# Patient Record
Sex: FEMALE | Race: WHITE
[De-identification: names, ages, dates, MRNs, and addresses within clinical notes are randomized per-mention and may not be internally consistent; named-entity substitution may affect disease eponyms.]

---

## 2017-05-28 ENCOUNTER — HOSPITAL ENCOUNTER (EMERGENCY)
Dept: HOSPITAL 10 - E/R | Age: 27
Discharge: HOME | End: 2017-05-28
Payer: SELF-PAY

## 2017-05-28 VITALS — WEIGHT: 154.32 LBS | HEIGHT: 55 IN | BODY MASS INDEX: 35.71 KG/M2

## 2017-05-28 VITALS
DIASTOLIC BLOOD PRESSURE: 92 MMHG | HEART RATE: 70 BPM | SYSTOLIC BLOOD PRESSURE: 136 MMHG | RESPIRATION RATE: 16 BRPM | TEMPERATURE: 98 F

## 2017-05-28 DIAGNOSIS — R11.2: ICD-10-CM

## 2017-05-28 DIAGNOSIS — K80.50: Primary | ICD-10-CM

## 2017-05-28 DIAGNOSIS — N39.0: ICD-10-CM

## 2017-05-28 DIAGNOSIS — Z87.891: ICD-10-CM

## 2017-05-28 LAB
ADD SCAN DIFF: NO
ADD UMIC: YES
ALBUMIN SERPL-MCNC: 4.7 G/DL (ref 3.3–4.9)
ALBUMIN/GLOB SERPL: 1.46 {RATIO}
ALP SERPL-CCNC: 56 IU/L (ref 42–121)
ALT SERPL-CCNC: 24 IU/L (ref 13–69)
ANION GAP SERPL CALC-SCNC: 11 MMOL/L (ref 8–16)
AST SERPL-CCNC: 21 IU/L (ref 15–46)
BACTERIA #/AREA URNS HPF: (no result) /[HPF]
BASOPHILS # BLD AUTO: 0 10^3/UL (ref 0–0.1)
BASOPHILS NFR BLD: 0.1 % (ref 0–2)
BILIRUB DIRECT SERPL-MCNC: 0 MG/DL (ref 0–0.2)
BILIRUB SERPL-MCNC: 0.5 MG/DL (ref 0.2–1.3)
BUN SERPL-MCNC: 14 MG/DL (ref 7–20)
CALCIUM SERPL-MCNC: 9.1 MG/DL (ref 8.4–10.2)
CHLORIDE SERPL-SCNC: 106 MMOL/L (ref 97–110)
CO2 SERPL-SCNC: 30 MMOL/L (ref 21–31)
COLOR UR: YELLOW
CREAT SERPL-MCNC: 0.61 MG/DL (ref 0.44–1)
EOSINOPHIL # BLD: 0.1 10^3/UL (ref 0–0.5)
EOSINOPHIL NFR BLD: 0.9 % (ref 0–7)
ERYTHROCYTE [DISTWIDTH] IN BLOOD BY AUTOMATED COUNT: 12.7 % (ref 11.5–14.5)
GLOBULIN SER-MCNC: 3.2 G/DL (ref 1.3–3.2)
GLUCOSE SERPL-MCNC: 91 MG/DL (ref 70–220)
GLUCOSE UR STRIP-MCNC: NEGATIVE %
HCT VFR BLD CALC: 46 % (ref 37–47)
HGB BLD-MCNC: 15.8 G/DL (ref 12–16)
KETONES UR STRIP.AUTO-MCNC: NEGATIVE MG/DL
LYMPHOCYTES # BLD AUTO: 0.9 10^3/UL (ref 0.8–2.9)
LYMPHOCYTES NFR BLD AUTO: 6.4 % (ref 15–51)
MCH RBC QN AUTO: 30.4 PG (ref 29–33)
MCHC RBC AUTO-ENTMCNC: 34.3 G/DL (ref 32–37)
MCV RBC AUTO: 88.6 FL (ref 82–101)
MONOCYTES # BLD: 0.6 10^3/UL (ref 0.3–0.9)
MONOCYTES NFR BLD: 4.5 % (ref 0–11)
NEUTROPHILS # BLD: 12 10^3/UL (ref 1.6–7.5)
NEUTROPHILS NFR BLD AUTO: 87.7 % (ref 39–77)
NITRITE UR QL STRIP.AUTO: NEGATIVE
NRBC # BLD MANUAL: 0 10^3/UL (ref 0–0)
NRBC BLD QL: 0 /100WBC (ref 0–0)
PLATELET # BLD: 285 10^3/UL (ref 140–415)
PMV BLD AUTO: 9.3 FL (ref 7.4–10.4)
POTASSIUM SERPL-SCNC: 3.7 MMOL/L (ref 3.5–5.1)
PROT SERPL-MCNC: 7.9 G/DL (ref 6.1–8.1)
RBC # BLD AUTO: 5.19 10^6/UL (ref 4.2–5.4)
RBC # UR AUTO: NEGATIVE /UL
RBC #/AREA URNS HPF: (no result) /HPF
SODIUM SERPL-SCNC: 143 MMOL/L (ref 135–144)
SQUAMOUS #/AREA URNS HPF: (no result) /[HPF]
URINE BILIRUBIN (DIP): NEGATIVE
URINE TOTAL PROTEIN (DIP): (no result)
UROBILINOGEN UR STRIP-ACNC: (no result) (ref 0.1–1)
WBC # BLD AUTO: 13.6 10^3/UL (ref 4.8–10.8)
WBC # UR STRIP: (no result) /UL

## 2017-05-28 PROCEDURE — 99285 EMERGENCY DEPT VISIT HI MDM: CPT

## 2017-05-28 PROCEDURE — 96374 THER/PROPH/DIAG INJ IV PUSH: CPT

## 2017-05-28 PROCEDURE — 76705 ECHO EXAM OF ABDOMEN: CPT

## 2017-05-28 PROCEDURE — 81001 URINALYSIS AUTO W/SCOPE: CPT

## 2017-05-28 PROCEDURE — 36415 COLL VENOUS BLD VENIPUNCTURE: CPT

## 2017-05-28 PROCEDURE — 83690 ASSAY OF LIPASE: CPT

## 2017-05-28 PROCEDURE — 85025 COMPLETE CBC W/AUTO DIFF WBC: CPT

## 2017-05-28 PROCEDURE — 96375 TX/PRO/DX INJ NEW DRUG ADDON: CPT

## 2017-05-28 PROCEDURE — 80053 COMPREHEN METABOLIC PANEL: CPT

## 2017-05-28 NOTE — ERD
ER Documentation


Chief Complaint


Date/Time


DATE: 5/28/17 


TIME: 20:25


Chief Complaint








HPI


26-year-old female with increasing sharp upper abdominal pain with nausea and 

vomiting.  She has a history of gallstones in fact scheduled for surgery next 

week to have her gallbladder removed.  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 

started to have unremitting pain.  She has had no fevers or chills.  She did 

just quit smoking.





ROS


All systems reviewed and are negative except as per history of present illness.





Medications


Home Meds


Active Scripts


Ondansetron (Ondansetron Odt) 4 Mg Tab.rapdis, 4 MG PO Q6H Y for NAUSEA AND/OR 

VOMITING, #20 TAB


   Prov:ANITHA NERI DO         5/28/17


Naproxen* (Naproxen*) 500 Mg Tablet, 500 MG PO BID Y for PAIN, #20 TAB


   Prov:ANITHA NERI DO         5/28/17


Hydrocodone/Acetaminophen (Norco 5-325 Tablet) 1 Each Tablet, 1 EACH PO Q6, #20 

TAB


   Prov:ANITHA NERI DO         5/28/17





PMhx/Soc


Medical and Surgical Hx:  pt denies Medical Hx, pt denies Surgical Hx


Hx Alcohol Use:  No


Hx Substance Use:  No


Hx Tobacco Use:  Yes


Smoking Status:  Former smoker





Physical Exam


Vitals





Vital Signs








  Date Time  Temp Pulse Resp B/P Pulse Ox O2 Delivery O2 Flow Rate FiO2


 


5/28/17 17:28 98.4 108 18 147/89 99   








Physical Exam


Const:  []


Head:   Atraumatic 


Eyes:    Normal Conjunctiva


ENT:    Normal External Ears, Nose and Mouth.


Neck:               Full range of motion..~ No meningismus.


Resp:    Clear to auscultation bilaterally


Cardio:    Regular rate and rhythm, no murmurs


Abd:    Soft, non tender, non distended. Normal bowel sounds


Skin:    No petechiae or rashes


Back:    No midline or flank tenderness


Ext:    No cyanosis, or edema


Neur:    Awake and alert


Psych:    Normal Mood and Affect


Result Diagram:  


5/28/17 1750                                                                   

             5/28/17 1750





Results 24 hrs





 Laboratory Tests








Test


  5/28/17


12:10 5/28/17


17:50


 


Urine Color YELLOW  


 


Urine Clarity CLEAR  


 


Urine pH 8.0  


 


Urine Specific Gravity 1.010  


 


Urine Ketones NEGATIVE  


 


Urine Nitrite NEGATIVE  


 


Urine Bilirubin NEGATIVE  


 


Urine Urobilinogen 1.0 E.U./dL  


 


Urine Leukocyte Esterase TRACE  


 


Urine Microscopic RBC Pending  


 


Urine Microscopic WBC Pending  


 


Urine Hemoglobin NEGATIVE  


 


Urine Glucose NEGATIVE%  


 


Urine Total Protein 1+  


 


White Blood Count  13.610^3/ul 


 


Red Blood Count  5.1910^6/ul 


 


Hemoglobin  15.8g/dl 


 


Hematocrit  46.0% 


 


Mean Corpuscular Volume  88.6fl 


 


Mean Corpuscular Hemoglobin  30.4pg 


 


Mean Corpuscular Hemoglobin


Concent 


  34.3g/dl 


 


 


Red Cell Distribution Width  12.7% 


 


Platelet Count  70218^3/UL 


 


Mean Platelet Volume  9.3fl 


 


Neutrophils %  87.7% 


 


Lymphocytes %  6.4% 


 


Monocytes %  4.5% 


 


Eosinophils %  0.9% 


 


Basophils %  0.1% 


 


Nucleated Red Blood Cells %  0.0/100WBC 


 


Neutrophils #  12.010^3/ul 


 


Lymphocytes #  0.910^3/ul 


 


Monocytes #  0.610^3/ul 


 


Eosinophils #  0.110^3/ul 


 


Basophils #  0.010^3/ul 


 


Nucleated Red Blood Cells #  0.010^3/ul 


 


Sodium Level  143mmol/L 


 


Potassium Level  3.7mmol/L 


 


Chloride Level  106mmol/L 


 


Carbon Dioxide Level  30mmol/L 


 


Anion Gap  11 


 


Blood Urea Nitrogen  14mg/dl 


 


Creatinine  0.61mg/dl 


 


Glucose Level  91mg/dl 


 


Calcium Level  9.1mg/dl 


 


Total Bilirubin  0.5mg/dl 


 


Direct Bilirubin  0.00mg/dl 


 


Indirect Bilirubin  0.5mg/dl 


 


Aspartate Amino Transf


(AST/SGOT) 


  21IU/L 


 


 


Alanine Aminotransferase


(ALT/SGPT) 


  24IU/L 


 


 


Alkaline Phosphatase  56IU/L 


 


Total Protein  7.9g/dl 


 


Albumin  4.7g/dl 


 


Globulin  3.20g/dl 


 


Albumin/Globulin Ratio  1.46 


 


Lipase  102U/L 








 Current Medications








 Medications


  (Trade)  Dose


 Ordered  Sig/Taras


 Route


 PRN Reason  Start Time


 Stop Time Status Last Admin


Dose Admin


 


 Sodium Chloride


  (NS)  1,000 ml @ 


 1,000 mls/hr  Q1H STAT


 IV


   5/28/17 17:33


 5/28/17 18:32 DC 5/28/17 17:58


 


 


 Morphine Sulfate


  (morphine)  2 mg  ONCE  STAT


 IV


   5/28/17 17:33


 5/28/17 17:34 DC 5/28/17 17:58


 


 


 Ondansetron HCl


  (Zofran Inj)  4 mg  ONCE  STAT


 IV


   5/28/17 17:33


 5/28/17 17:34 DC 5/28/17 17:58


 


 


 Nicotine


  (Nicoderm 21 Mg/


 24hr)  1 patch  ONCE  STAT


 TRANSDERM


   5/28/17 18:11


 5/28/17 18:13 DC  


 


 


 Morphine Sulfate


  (morphine)  4 mg  ONCE  STAT


 IV


   5/28/17 18:11


 5/28/17 18:13 DC 5/28/17 18:23


 


 


 Ondansetron HCl


  (Zofran Inj)  4 mg  ONCE  STAT


 IV


   5/28/17 18:11


 5/28/17 18:13 DC 5/28/17 18:22


 


 


 Metoclopramide HCl


  (Reglan)  10 mg  ONCE  ONCE


 IV


   5/28/17 18:30


 5/28/17 18:31 DC 5/28/17 18:23


 











Procedures/MDM


Biliary colic.  Patient does have mild leukocytosis without any structural 

evidence of cholecystitis currently.  Also no elevated liver enzymes are 

elevated bilirubin.  Fortunately she is scheduled to have her gallbladder 

removed in the near future.  Pain was controlled emergency room with morphine 

and nausea was controlled with 2 doses of Zofran and Reglan.  She was given a 

liter of normal saline for hydration.  Is also given a nicotine patch.  She has 

trace leukocyte esterase in her urine as well as elevated white count and 

abdominal pain with vomiting going to treat her for a UTI with Keflex for 3 

days as well.  It is possible that nicotine withdrawals are contributing to her 

nausea.  She is currently feeling well and going to discharge her with Norco, 

naproxen, Zofran.  Return precautions also given for any fevers or increasing 

pain.





Right upper quadrant ultrasound interpretation: Gallstones without evidence of 

biliary obstruction.





Departure


Diagnosis:  


 Primary Impression:  


 Biliary colic


 Additional Impressions:  


 Acute abdominal pain


 UTI (urinary tract infection)


Condition:  Stable


Patient Instructions:  Biliary Colic With Gallstone (Confirmed)





Additional Instructions:  


Call your primary care doctor TOMORROW for an appointment during the next 2-3 

days.See the doctor sooner or return here if your condition worsens before your 

appointment time.











ANITHA NERI DO May 28, 2017 20:33

## 2017-05-28 NOTE — RADRPT
PROCEDURE:   US Abdomen Limited. 

 

CLINICAL INDICATION:   Pain

 

TECHNIQUE:   Multiple real-time longitudinal and transverse images were acquired of the patient's [facility] abdomen and retroperitoneum utilizing a curved array transducer. 

 

COMPARISON:   None 

 

FINDINGS:

 

Pancreas:  The pancreas is suboptimally visualized in the tail. There is no significant abnormality 
in the visualized portion. The main pancreatic duct is not dilated.

 

Liver:  The liver shows normal shape, parenchymal echogenicity and echotexture. The right hepatic lo
be measures 15.2 cm craniocaudal, which is within normal limits. There is no definite focal lesion v
isualized in the liver.    

 

Bile ducts:  The intrahepatic bile ducts are not dilated.  Common bile duct measures 2 mm in diamete
r, within normal limits.  

 

Gallbladder:  A few gallstones are visualized within the gallbladder measuring up to 1.5 cm with pos
terior shadowing.  There is no gallbladder wall thickening or pericholecystic fluid.

 

Kidneys:  The right kidney measures 10.6 cm in length. The parenchymal echogenicity and thickness ap
pear within normal range.  No focal lesions are visualized.  No hydronephrosis.

 

There is no ascites visualized in the right abdomen. 

 

RPTAT: ZZ

 

IMPRESSION:

Cholelithiasis. No sonographic evidence for cholecystitis.

_____________________________________________ 

.Vonda Joshi MD, MD           Date    Time 

Electronically viewed and signed by .Vonda Joshi MD, MD on 05/28/2017 19:58 

 

D:  05/28/2017 19:58  T:  05/28/2017 19:58

.T/

## 2017-12-12 ENCOUNTER — HOSPITAL ENCOUNTER (EMERGENCY)
Dept: HOSPITAL 10 - E/R | Age: 27
Discharge: HOME | End: 2017-12-12
Payer: COMMERCIAL

## 2017-12-12 VITALS
WEIGHT: 152.32 LBS | BODY MASS INDEX: 28.76 KG/M2 | BODY MASS INDEX: 28.76 KG/M2 | HEIGHT: 61 IN | HEIGHT: 61 IN | WEIGHT: 152.32 LBS

## 2017-12-12 VITALS
SYSTOLIC BLOOD PRESSURE: 133 MMHG | RESPIRATION RATE: 16 BRPM | TEMPERATURE: 98.6 F | HEART RATE: 78 BPM | DIASTOLIC BLOOD PRESSURE: 85 MMHG

## 2017-12-12 DIAGNOSIS — R51: ICD-10-CM

## 2017-12-12 DIAGNOSIS — R40.2252: ICD-10-CM

## 2017-12-12 DIAGNOSIS — I10: Primary | ICD-10-CM

## 2017-12-12 DIAGNOSIS — R40.2362: ICD-10-CM

## 2017-12-12 DIAGNOSIS — Z3A.12: ICD-10-CM

## 2017-12-12 DIAGNOSIS — R40.2142: ICD-10-CM

## 2017-12-12 DIAGNOSIS — Z87.891: ICD-10-CM

## 2017-12-12 LAB
ALBUMIN SERPL-MCNC: 4 G/DL (ref 3.3–4.9)
ALBUMIN/GLOB SERPL: 1.21 {RATIO}
ALP SERPL-CCNC: 55 IU/L (ref 42–121)
ALT SERPL-CCNC: 41 IU/L (ref 13–69)
ANION GAP SERPL CALC-SCNC: 14 MMOL/L (ref 8–16)
AST SERPL-CCNC: 22 IU/L (ref 15–46)
BASOPHILS # BLD AUTO: 0 10^3/UL (ref 0–0.1)
BASOPHILS NFR BLD: 0.4 % (ref 0–2)
BILIRUB DIRECT SERPL-MCNC: 0 MG/DL (ref 0–0.2)
BILIRUB SERPL-MCNC: 0.2 MG/DL (ref 0.2–1.3)
BUN SERPL-MCNC: 9 MG/DL (ref 7–20)
CALCIUM SERPL-MCNC: 9.7 MG/DL (ref 8.4–10.2)
CHLORIDE SERPL-SCNC: 103 MMOL/L (ref 97–110)
CO2 SERPL-SCNC: 25 MMOL/L (ref 21–31)
CREAT SERPL-MCNC: 0.54 MG/DL (ref 0.44–1)
EOSINOPHIL # BLD: 0.1 10^3/UL (ref 0–0.5)
EOSINOPHIL NFR BLD: 0.8 % (ref 0–7)
ERYTHROCYTE [DISTWIDTH] IN BLOOD BY AUTOMATED COUNT: 13.3 % (ref 11.5–14.5)
GLOBULIN SER-MCNC: 3.3 G/DL (ref 1.3–3.2)
GLUCOSE SERPL-MCNC: 87 MG/DL (ref 70–220)
HCT VFR BLD CALC: 37.9 % (ref 37–47)
HGB BLD-MCNC: 13.6 G/DL (ref 12–16)
LYMPHOCYTES # BLD AUTO: 1.6 10^3/UL (ref 0.8–2.9)
LYMPHOCYTES NFR BLD AUTO: 13.9 % (ref 15–51)
MCH RBC QN AUTO: 30.4 PG (ref 29–33)
MCHC RBC AUTO-ENTMCNC: 35.9 G/DL (ref 32–37)
MCV RBC AUTO: 84.8 FL (ref 82–101)
MONOCYTES # BLD: 0.8 10^3/UL (ref 0.3–0.9)
MONOCYTES NFR BLD: 6.8 % (ref 0–11)
NEUTROPHILS # BLD: 8.8 10^3/UL (ref 1.6–7.5)
NEUTROPHILS NFR BLD AUTO: 77.4 % (ref 39–77)
NRBC # BLD MANUAL: 0 10^3/UL (ref 0–0)
NRBC BLD AUTO-RTO: 0 /100WBC (ref 0–0)
PLATELET # BLD: 261 10^3/UL (ref 140–415)
PMV BLD AUTO: 9.3 FL (ref 7.4–10.4)
POTASSIUM SERPL-SCNC: 3.7 MMOL/L (ref 3.5–5.1)
PROT SERPL-MCNC: 7.3 G/DL (ref 6.1–8.1)
RBC # BLD AUTO: 4.47 10^6/UL (ref 4.2–5.4)
SODIUM SERPL-SCNC: 138 MMOL/L (ref 135–144)
WBC # BLD AUTO: 11.3 10^3/UL (ref 4.8–10.8)

## 2017-12-12 PROCEDURE — 99284 EMERGENCY DEPT VISIT MOD MDM: CPT

## 2017-12-12 PROCEDURE — 80053 COMPREHEN METABOLIC PANEL: CPT

## 2017-12-12 PROCEDURE — 96375 TX/PRO/DX INJ NEW DRUG ADDON: CPT

## 2017-12-12 PROCEDURE — 85025 COMPLETE CBC W/AUTO DIFF WBC: CPT

## 2017-12-12 PROCEDURE — 36415 COLL VENOUS BLD VENIPUNCTURE: CPT

## 2017-12-12 PROCEDURE — 96374 THER/PROPH/DIAG INJ IV PUSH: CPT

## 2017-12-12 NOTE — ERD
ER Documentation


Chief Complaint


Chief Complaint


ha, dizziness, nausea 12 wks pregnant; left eye pressure





HPI


Patient is a 27-year-old female with gestational diabetes who presents with a 

headache.  She said that she started with a headache at 6 AM and tried 

ibuprofen at 8 AM.  The headache has been gradual in onset and is gotten worse.

  She has left-sided eye pain as well and felt dizzy and pressure.  She is 12 

weeks pregnant.  Upon review of old medical records the patient one previous 

visit to the ER in May 2017.





ROS


All systems reviewed and are negative except as per history of present illness.





Medications


Home Meds


Active Scripts


Acetaminophen* (Tylenol*) 325 Mg Tablet, 2 TAB PO Q8 Y for PAIN AND OR ELEVATED 

TEMP, #20 TAB


   Prov:ANNAMARIE NIXON MD         12/12/17


Cephalexin* (Keflex*) 500 Mg Capsule, 500 MG PO TID for 3 Days, CAP


   Prov:ANITHA NERI DO         5/28/17


Ondansetron (Ondansetron Odt) 4 Mg Tab.rapdis, 4 MG PO Q6H Y for NAUSEA AND/OR 

VOMITING, #20 TAB


   Prov:ANITHA NERI DO         5/28/17


Naproxen* (Naproxen*) 500 Mg Tablet, 500 MG PO BID Y for PAIN, #20 TAB


   Prov:ANITHA NERI DO         5/28/17


Hydrocodone/Acetaminophen (Norco 5-325 Tablet) 1 Each Tablet, 1 EACH PO Q6, #20 

TAB


   Prov:ANITHA NERI DO         5/28/17





Allergies


Allergies:  


Coded Allergies:  


     No Known Allergy (Unverified , 12/12/17)





PMhx/Soc


History of Surgery:  Yes (lap jaqui)


Anesthesia Reaction:  No


Hx Neurological Disorder:  No


Hx Respiratory Disorders:  No


Hx Cardiac Disorders:  No


Hx Psychiatric Problems:  No


Hx Miscellaneous Medical Probl:  Yes (gestational diabetes)


Hx Alcohol Use:  No


Hx Substance Use:  No


Hx Tobacco Use:  Yes


Smoking Status:  Former smoker





FmHx


Family History:  diabetes





Physical Exam


Vitals





Vital Signs








  Date Time  Temp Pulse Resp B/P Pulse Ox O2 Delivery O2 Flow Rate FiO2


 


12/12/17 11:31 98.4 104 18 149/91 100   








Physical Exam


Const: Mild distress secondary to headache


Head:   Atraumatic 


Eyes:    Normal Conjunctiva


ENT:    Normal External Ears, Nose and Mouth.


Neck:               Full range of motion..~ No meningismus.


Resp:    Clear to auscultation bilaterally


Cardio:    Regular rate and rhythm, no murmurs


Abd:    Soft, pregnant abdomen without pain


Skin:    No petechiae or rashes


Back:    No midline or flank tenderness


Ext:    No cyanosis, or edema


Neur:    Awake and alert, cranial nerves II through XII intact, strength is 5 

out of 5 in all 4 extremities, no pronator drift, no slurred speech


Psych:    Normal Mood and Affect


Result Diagram:  


12/12/17 1210                                                                  

              12/12/17 1210





Results 24 hrs





 Laboratory Tests








Test


  12/12/17


12:10


 


White Blood Count 11.310^3/ul 


 


Red Blood Count 4.4710^6/ul 


 


Hemoglobin 13.6g/dl 


 


Hematocrit 37.9% 


 


Mean Corpuscular Volume 84.8fl 


 


Mean Corpuscular Hemoglobin 30.4pg 


 


Mean Corpuscular Hemoglobin


Concent 35.9g/dl 


 


 


Red Cell Distribution Width 13.3% 


 


Platelet Count 33678^3/UL 


 


Mean Platelet Volume 9.3fl 


 


Neutrophils % 77.4% 


 


Lymphocytes % 13.9% 


 


Monocytes % 6.8% 


 


Eosinophils % 0.8% 


 


Basophils % 0.4% 


 


Nucleated Red Blood Cells % 0.0/100WBC 


 


Neutrophils # 8.810^3/ul 


 


Lymphocytes # 1.610^3/ul 


 


Monocytes # 0.810^3/ul 


 


Eosinophils # 0.110^3/ul 


 


Basophils # 0.010^3/ul 


 


Nucleated Red Blood Cells # 0.010^3/ul 


 


Sodium Level 138mmol/L 


 


Potassium Level 3.7mmol/L 


 


Chloride Level 103mmol/L 


 


Carbon Dioxide Level 25mmol/L 


 


Anion Gap 14 


 


Blood Urea Nitrogen 9mg/dl 


 


Creatinine 0.54mg/dl 


 


Glucose Level 87mg/dl 


 


Calcium Level 9.7mg/dl 


 


Total Bilirubin 0.2mg/dl 


 


Direct Bilirubin 0.00mg/dl 


 


Indirect Bilirubin 0.2mg/dl 


 


Aspartate Amino Transf


(AST/SGOT) 22IU/L 


 


 


Alanine Aminotransferase


(ALT/SGPT) 41IU/L 


 


 


Alkaline Phosphatase 55IU/L 


 


Total Protein 7.3g/dl 


 


Albumin 4.0g/dl 


 


Globulin 3.30g/dl 


 


Albumin/Globulin Ratio 1.21 








 Current Medications








 Medications


  (Trade)  Dose


 Ordered  Sig/Taras


 Route


 PRN Reason  Start Time


 Stop Time Status Last Admin


Dose Admin


 


 Sodium Chloride


  (NS)  1,000 ml @ 


 1,000 mls/hr  Q1H STAT


 IV


   12/12/17 11:42


 12/12/17 12:41 DC 12/12/17 12:05


 


 


 Morphine Sulfate


  (morphine)  4 mg  ONCE  STAT


 IV


   12/12/17 11:42


 12/12/17 11:44 DC 12/12/17 12:05


 


 


 Ondansetron HCl


  (Zofran Inj)  4 mg  ONCE  STAT


 IV


   12/12/17 11:42


 12/12/17 11:44 DC 12/12/17 12:05


 











Procedures/MDM


Patient is a 27-year-old female presents with headache.  I see no signs of 

stroke at this time and her neurologic exam is normal.  I believe her symptoms 

are consistent with a tension type or migraine headache.  Laboratory studies 

were basically normal.  She had a mildly elevated blood pressure in the first 

trimester pregnancy.  I doubt preeclampsia or eclampsia at this time but the 

patient will need to follow this closely with her doctor.  She can return for 

any worsening symptoms.  She was given morphine and Zofran.  We discussed the 

possibility of a CT scan but at this point I feel the risks outweigh the 

benefits.  She can follow-up with her primary doctor within 24-48 hours.  She 

can return sooner for any worsening symptoms.





Departure


Diagnosis:  


 Primary Impression:  


 Hypertension affecting pregnancy in first trimester


 Additional Impression:  


 Headache


 Headache type:  unspecified  Headache chronicity pattern:  acute headache  

Intractability:  not intractable  Qualified Code:  R51 - Acute nonintractable 

headache, unspecified headache type


Condition:  Fair


Patient Instructions:  Self-Care for Headaches, High Blood Pressure (

Hypertension)


Referrals:  


Your doctor





Additional Instructions:  


Call your primary care doctor TOMORROW for an appointment during the next 1-2 

days.See the doctor sooner or return here if your condition worsens before your 

appointment time.











ANNAMARIE NIXON MD Dec 12, 2017 13:34

## 2018-01-10 ENCOUNTER — HOSPITAL ENCOUNTER (EMERGENCY)
Dept: HOSPITAL 91 - E/R | Age: 28
Discharge: HOME | End: 2018-01-10
Payer: COMMERCIAL

## 2018-01-10 ENCOUNTER — HOSPITAL ENCOUNTER (EMERGENCY)
Age: 28
Discharge: HOME | End: 2018-01-10

## 2018-01-10 DIAGNOSIS — M94.0: ICD-10-CM

## 2018-01-10 DIAGNOSIS — O99.89: Primary | ICD-10-CM

## 2018-01-10 DIAGNOSIS — N13.9: ICD-10-CM

## 2018-01-10 DIAGNOSIS — R42: ICD-10-CM

## 2018-01-10 LAB
ADD MAN DIFF?: NO
ADD UMIC: YES
ALANINE AMINOTRANSFERASE: 31 IU/L (ref 13–69)
ALBUMIN/GLOBULIN RATIO: 1.33
ALBUMIN: 4 G/DL (ref 3.3–4.9)
ALKALINE PHOSPHATASE: 63 IU/L (ref 42–121)
ANION GAP: 14 (ref 8–16)
ASPARTATE AMINO TRANSFERASE: 20 IU/L (ref 15–46)
B-TYPE NATRIURETIC PEPTIDE: 36 PG/ML (ref 0–125)
BASOPHIL #: 0 10^3/UL (ref 0–0.1)
BASOPHILS %: 0.4 % (ref 0–2)
BILIRUBIN,DIRECT: 0 MG/DL (ref 0–0.2)
BILIRUBIN,TOTAL: 0.2 MG/DL (ref 0.2–1.3)
BLOOD UREA NITROGEN: 6 MG/DL (ref 7–20)
CALCIUM: 9.3 MG/DL (ref 8.4–10.2)
CARBON DIOXIDE: 24 MMOL/L (ref 21–31)
CHLORIDE: 104 MMOL/L (ref 97–110)
CK INDEX: 0.5
CK-MB: 0.28 NG/ML (ref 0–2.4)
CREATINE KINASE: 51 IU/L (ref 23–200)
CREATININE: 0.51 MG/DL (ref 0.44–1)
EOSINOPHILS #: 0.1 10^3/UL (ref 0–0.5)
EOSINOPHILS %: 0.7 % (ref 0–7)
GLOBULIN: 3 G/DL (ref 1.3–3.2)
GLUCOSE: 111 MG/DL (ref 70–220)
HEMATOCRIT: 36.5 % (ref 37–47)
HEMOGLOBIN: 12.8 G/DL (ref 12–16)
LYMPHOCYTES #: 1.7 10^3/UL (ref 0.8–2.9)
LYMPHOCYTES %: 15.2 % (ref 15–51)
MAGNESIUM: 2 MG/DL (ref 1.7–2.5)
MEAN CORPUSCULAR HEMOGLOBIN: 30.3 PG (ref 29–33)
MEAN CORPUSCULAR HGB CONC: 35.1 G/DL (ref 32–37)
MEAN CORPUSCULAR VOLUME: 86.3 FL (ref 82–101)
MEAN PLATELET VOLUME: 9.5 FL (ref 7.4–10.4)
MONOCYTE #: 0.7 10^3/UL (ref 0.3–0.9)
MONOCYTES %: 5.9 % (ref 0–11)
NEUTROPHIL #: 8.5 10^3/UL (ref 1.6–7.5)
NEUTROPHILS %: 76.5 % (ref 39–77)
NUCLEATED RED BLOOD CELLS #: 0 10^3/UL (ref 0–0)
NUCLEATED RED BLOOD CELLS%: 0 /100WBC (ref 0–0)
PLATELET COUNT: 242 10^3/UL (ref 140–415)
POTASSIUM: 3.6 MMOL/L (ref 3.5–5.1)
RED BLOOD COUNT: 4.23 10^6/UL (ref 4.2–5.4)
RED CELL DISTRIBUTION WIDTH: 13.4 % (ref 11.5–14.5)
SODIUM: 138 MMOL/L (ref 135–144)
TOTAL PROTEIN: 7 G/DL (ref 6.1–8.1)
TROPONIN-I: < 0.012 NG/ML (ref 0–0.12)
UR ASCORBIC ACID: NEGATIVE MG/DL
UR BACTERIA: (no result) /HPF
UR BILIRUBIN (DIP): NEGATIVE MG/DL
UR BLOOD (DIP): NEGATIVE MG/DL
UR CLARITY: (no result)
UR COLOR: YELLOW
UR GLUCOSE (DIP): NEGATIVE MG/DL
UR KETONES (DIP): NEGATIVE MG/DL
UR LEUKOCYTE ESTERASE (DIP): (no result) LEU/UL
UR NITRITE (DIP): NEGATIVE MG/DL
UR PH (DIP): 6 (ref 5–9)
UR RBC: 4 /HPF (ref 0–5)
UR SPECIFIC GRAVITY (DIP): 1 (ref 1–1.03)
UR SQUAMOUS EPITHELIAL CELL: (no result) /HPF
UR TOTAL PROTEIN (DIP): NEGATIVE MG/DL
UR UROBILINOGEN (DIP): NEGATIVE MG/DL
UR WBC: 48 /HPF (ref 0–5)
WHITE BLOOD COUNT: 11.1 10^3/UL (ref 4.8–10.8)

## 2018-01-10 PROCEDURE — 84702 CHORIONIC GONADOTROPIN TEST: CPT

## 2018-01-10 PROCEDURE — 80053 COMPREHEN METABOLIC PANEL: CPT

## 2018-01-10 PROCEDURE — 83880 ASSAY OF NATRIURETIC PEPTIDE: CPT

## 2018-01-10 PROCEDURE — 96374 THER/PROPH/DIAG INJ IV PUSH: CPT

## 2018-01-10 PROCEDURE — 82550 ASSAY OF CK (CPK): CPT

## 2018-01-10 PROCEDURE — 82553 CREATINE MB FRACTION: CPT

## 2018-01-10 PROCEDURE — 85025 COMPLETE CBC W/AUTO DIFF WBC: CPT

## 2018-01-10 PROCEDURE — 87086 URINE CULTURE/COLONY COUNT: CPT

## 2018-01-10 PROCEDURE — 81001 URINALYSIS AUTO W/SCOPE: CPT

## 2018-01-10 PROCEDURE — 96375 TX/PRO/DX INJ NEW DRUG ADDON: CPT

## 2018-01-10 PROCEDURE — 76805 OB US >/= 14 WKS SNGL FETUS: CPT

## 2018-01-10 PROCEDURE — 36415 COLL VENOUS BLD VENIPUNCTURE: CPT

## 2018-01-10 PROCEDURE — 93005 ELECTROCARDIOGRAM TRACING: CPT

## 2018-01-10 PROCEDURE — 96376 TX/PRO/DX INJ SAME DRUG ADON: CPT

## 2018-01-10 PROCEDURE — 99285 EMERGENCY DEPT VISIT HI MDM: CPT

## 2018-01-10 PROCEDURE — 83735 ASSAY OF MAGNESIUM: CPT

## 2018-01-10 PROCEDURE — 84484 ASSAY OF TROPONIN QUANT: CPT

## 2018-01-10 RX ADMIN — THIAMINE HYDROCHLORIDE 1 MLS/HR: 100 INJECTION, SOLUTION INTRAMUSCULAR; INTRAVENOUS at 10:52

## 2018-01-10 RX ADMIN — ONDANSETRON HYDROCHLORIDE 1 MG: 2 INJECTION, SOLUTION INTRAMUSCULAR; INTRAVENOUS at 12:13

## 2018-01-10 RX ADMIN — MORPHINE SULFATE 1 MG: 2 INJECTION, SOLUTION INTRAMUSCULAR; INTRAVENOUS at 12:13

## 2018-01-10 RX ADMIN — ONDANSETRON HYDROCHLORIDE 1 MG: 2 INJECTION, SOLUTION INTRAMUSCULAR; INTRAVENOUS at 10:51

## 2018-01-10 RX ADMIN — CEFAZOLIN 1 MLS/HR: 1 INJECTION, POWDER, FOR SOLUTION INTRAMUSCULAR; INTRAVENOUS at 13:18

## 2019-02-12 ENCOUNTER — HOSPITAL ENCOUNTER (EMERGENCY)
Dept: HOSPITAL 91 - E/R | Age: 29
Discharge: HOME | End: 2019-02-12
Payer: COMMERCIAL

## 2019-02-12 DIAGNOSIS — H53.149: ICD-10-CM

## 2019-02-12 DIAGNOSIS — R55: Primary | ICD-10-CM

## 2019-02-12 LAB
ADD MAN DIFF?: NO
ADD UMIC: YES
ANION GAP: 13 (ref 5–13)
BASOPHIL #: 0 10^3/UL (ref 0–0.1)
BASOPHILS %: 0.5 % (ref 0–2)
BLOOD UREA NITROGEN: 14 MG/DL (ref 7–20)
CALCIUM: 9.8 MG/DL (ref 8.4–10.2)
CARBON DIOXIDE: 28 MMOL/L (ref 21–31)
CHLORIDE: 99 MMOL/L (ref 97–110)
CREATININE: 0.59 MG/DL (ref 0.44–1)
EOSINOPHILS #: 0.1 10^3/UL (ref 0–0.5)
EOSINOPHILS %: 1.2 % (ref 0–7)
GLUCOSE: 126 MG/DL (ref 70–220)
HEMATOCRIT: 43.2 % (ref 37–47)
HEMOGLOBIN: 14.7 G/DL (ref 12–16)
IMMATURE GRANS #M: 0.03 10^3/UL (ref 0–0.03)
IMMATURE GRANS % (M): 0.4 % (ref 0–0.43)
INR: 0.87
LYMPHOCYTES #: 2 10^3/UL (ref 0.8–2.9)
LYMPHOCYTES %: 24.6 % (ref 15–51)
MEAN CORPUSCULAR HEMOGLOBIN: 28.8 PG (ref 29–33)
MEAN CORPUSCULAR HGB CONC: 34 G/DL (ref 32–37)
MEAN CORPUSCULAR VOLUME: 84.7 FL (ref 82–101)
MEAN PLATELET VOLUME: 9.6 FL (ref 7.4–10.4)
MONOCYTE #: 0.5 10^3/UL (ref 0.3–0.9)
MONOCYTES %: 6.3 % (ref 0–11)
NEUTROPHIL #: 5.6 10^3/UL (ref 1.6–7.5)
NEUTROPHILS %: 67 % (ref 39–77)
NUCLEATED RED BLOOD CELLS #: 0 10^3/UL (ref 0–0)
NUCLEATED RED BLOOD CELLS%: 0 /100WBC (ref 0–0)
PLATELET COUNT: 278 10^3/UL (ref 140–415)
POTASSIUM: 3.9 MMOL/L (ref 3.5–5.1)
PROTIME: 11.9 SEC (ref 11.9–14.9)
PT RATIO: 0.9
RED BLOOD COUNT: 5.1 10^6/UL (ref 4.2–5.4)
RED CELL DISTRIBUTION WIDTH: 12.7 % (ref 11.5–14.5)
SODIUM: 140 MMOL/L (ref 135–144)
UR ASCORBIC ACID: 40 MG/DL
UR BILIRUBIN (DIP): NEGATIVE MG/DL
UR BLOOD (DIP): NEGATIVE MG/DL
UR CLARITY: CLEAR
UR COLOR: YELLOW
UR GLUCOSE (DIP): NEGATIVE MG/DL
UR KETONES (DIP): NEGATIVE MG/DL
UR LEUKOCYTE ESTERASE (DIP): (no result) LEU/UL
UR MUCUS: (no result) /HPF
UR NITRITE (DIP): NEGATIVE MG/DL
UR PH (DIP): 5 (ref 5–9)
UR RBC: 1 /HPF (ref 0–5)
UR SPECIFIC GRAVITY (DIP): 1.01 (ref 1–1.03)
UR SQUAMOUS EPITHELIAL CELL: (no result) /HPF
UR TOTAL PROTEIN (DIP): NEGATIVE MG/DL
UR UROBILINOGEN (DIP): NEGATIVE MG/DL
UR WBC: 5 /HPF (ref 0–5)
WHITE BLOOD COUNT: 8.3 10^3/UL (ref 4.8–10.8)

## 2019-02-12 PROCEDURE — 81001 URINALYSIS AUTO W/SCOPE: CPT

## 2019-02-12 PROCEDURE — 93005 ELECTROCARDIOGRAM TRACING: CPT

## 2019-02-12 PROCEDURE — 70470 CT HEAD/BRAIN W/O & W/DYE: CPT

## 2019-02-12 PROCEDURE — 99285 EMERGENCY DEPT VISIT HI MDM: CPT

## 2019-02-12 PROCEDURE — 96374 THER/PROPH/DIAG INJ IV PUSH: CPT

## 2019-02-12 PROCEDURE — 85025 COMPLETE CBC W/AUTO DIFF WBC: CPT

## 2019-02-12 PROCEDURE — 82962 GLUCOSE BLOOD TEST: CPT

## 2019-02-12 PROCEDURE — 96375 TX/PRO/DX INJ NEW DRUG ADDON: CPT

## 2019-02-12 PROCEDURE — 71045 X-RAY EXAM CHEST 1 VIEW: CPT

## 2019-02-12 PROCEDURE — 36415 COLL VENOUS BLD VENIPUNCTURE: CPT

## 2019-02-12 PROCEDURE — 81025 URINE PREGNANCY TEST: CPT

## 2019-02-12 PROCEDURE — 80048 BASIC METABOLIC PNL TOTAL CA: CPT

## 2019-02-12 PROCEDURE — 85610 PROTHROMBIN TIME: CPT

## 2019-02-12 RX ADMIN — SODIUM CHLORIDE 1 ML: 9 INJECTION, SOLUTION INTRAMUSCULAR; INTRAVENOUS; SUBCUTANEOUS at 15:58

## 2019-02-12 RX ADMIN — IOHEXOL 1 ML: 300 INJECTION, SOLUTION INTRAVENOUS at 15:59

## 2019-02-12 RX ADMIN — METOCLOPRAMIDE HYDROCHLORIDE 1 MG: 10 INJECTION, SOLUTION INTRAMUSCULAR; INTRAVENOUS at 14:37

## 2019-02-12 RX ADMIN — VASOPRESSIN 1 ML/8 S: 20 INJECTION, SOLUTION INTRAMUSCULAR; SUBCUTANEOUS at 15:58

## 2019-02-12 RX ADMIN — DIPHENHYDRAMINE HYDROCHLORIDE 1 MG: 50 INJECTION, SOLUTION INTRAMUSCULAR; INTRAVENOUS at 14:37

## 2019-02-12 RX ADMIN — THIAMINE HYDROCHLORIDE 1 MLS/HR: 100 INJECTION, SOLUTION INTRAMUSCULAR; INTRAVENOUS at 14:37
